# Patient Record
Sex: FEMALE
[De-identification: names, ages, dates, MRNs, and addresses within clinical notes are randomized per-mention and may not be internally consistent; named-entity substitution may affect disease eponyms.]

---

## 2019-07-05 PROBLEM — Z00.00 ENCOUNTER FOR PREVENTIVE HEALTH EXAMINATION: Status: ACTIVE | Noted: 2019-07-05

## 2019-07-17 ENCOUNTER — APPOINTMENT (OUTPATIENT)
Dept: OPHTHALMOLOGY | Facility: CLINIC | Age: 64
End: 2019-07-17
Payer: COMMERCIAL

## 2019-07-17 ENCOUNTER — NON-APPOINTMENT (OUTPATIENT)
Age: 64
End: 2019-07-17

## 2019-07-17 PROCEDURE — 92004 COMPRE OPH EXAM NEW PT 1/>: CPT

## 2021-03-04 ENCOUNTER — APPOINTMENT (OUTPATIENT)
Dept: ORTHOPEDIC SURGERY | Facility: CLINIC | Age: 66
End: 2021-03-04
Payer: COMMERCIAL

## 2021-03-04 VITALS
OXYGEN SATURATION: 95 % | HEIGHT: 64 IN | BODY MASS INDEX: 30.73 KG/M2 | SYSTOLIC BLOOD PRESSURE: 171 MMHG | HEART RATE: 95 BPM | DIASTOLIC BLOOD PRESSURE: 81 MMHG | WEIGHT: 180 LBS

## 2021-03-04 DIAGNOSIS — S82.65XA NONDISPLACED FRACTURE OF LATERAL MALLEOLUS OF LEFT FIBULA, INITIAL ENCOUNTER FOR CLOSED FRACTURE: ICD-10-CM

## 2021-03-04 PROCEDURE — 99072 ADDL SUPL MATRL&STAF TM PHE: CPT

## 2021-03-04 PROCEDURE — 99203 OFFICE O/P NEW LOW 30 MIN: CPT

## 2021-03-04 NOTE — DATA REVIEWED
[de-identified] : 3/4/2021–left ankle x-rays (3 view): There is a nondisplaced lateral malleolus fracture.

## 2021-03-04 NOTE — HISTORY OF PRESENT ILLNESS
[FreeTextEntry1] : This is a 65F with no past medical history who is presenting for evaluation of left-sided ankle pain.  This began yesterday after she sustained a fall down the stairs twisting her left ankle.  She was able to bear weight after the fall but was limping.  She was seen at a local urgent care this morning where x-rays revealed a nondisplaced lateral malleolus fracture.  She was given an Aircast and a cane.  She says her pain is currently 6 out of 10 in severity localized to her lateral ankle.  She denies any medial ankle pain.  She has been taking Tylenol which has helped.

## 2021-03-04 NOTE — DISCUSSION/SUMMARY
[de-identified] : This is a 65-year-old female with a Rousseau A left lateral malleolus ankle fracture.  The treatment is conservative with weightbearing as tolerated in an Aircast.  I have also recommended elevation, compression, and icing 3 times a day.  May return in 3 weeks for reassessment and new x-rays at which point we may consider physical therapy if needed.

## 2021-03-04 NOTE — PHYSICAL EXAM
[FreeTextEntry1] : General: well nourished, in no acute distress, alert and oriented to person, place and time.\par Psychiatric: normal mood and affect, no abnormal movements or speech patterns.\par Eyes: vision intact without deficits, sclera and conjunctiva were normal, pupils were equal in size. \par ENT: Ears and nose were normal in appearance. No thyromegaly.\par Lymph: no enlarged nodes, no lymphedema in extremity.\par Respiratory: Normal respiratory rhythm and effort. No wheezing detected without auscultation. No shortness of breath or respiratory distress.\par Cardiac: no cardiac related leg swelling, 2+ peripheral pulses.\par Neurology: normal gross sensation in extremities to light touch.\par Abdomen: soft, non-tender, tympanic, no masses.\par \par LLE:\par \par Skin: Clean, dry, intact\par Inspection: No obvious malalignment. Minimal swelling.\par Tenderness: No ttp over achilles insertion, +tenderness over the lateral malleolus. No TTP over medial malleolus. No tenderness proximal fibula. No tenderness about heel, no pain with heel squeeze.\par ROM: dorsi flexion 20, degrees plantar flexion 40° normal subtalar motion. \par Stability: Negative anterior/posterior drawer.\par Strength: 5/5 TA/GS/EHL\par Sensation: Intact to light touch in dp/sp/tib/jade/saph distributions\par Pulses: 2+ DP/PT pulses\par Gait: Able to bear weight and ambulate using the Aircast.

## 2021-03-06 ENCOUNTER — TRANSCRIPTION ENCOUNTER (OUTPATIENT)
Age: 66
End: 2021-03-06

## 2023-06-20 ENCOUNTER — NON-APPOINTMENT (OUTPATIENT)
Age: 68
End: 2023-06-20